# Patient Record
Sex: MALE | Race: WHITE | NOT HISPANIC OR LATINO | Employment: STUDENT | URBAN - METROPOLITAN AREA
[De-identification: names, ages, dates, MRNs, and addresses within clinical notes are randomized per-mention and may not be internally consistent; named-entity substitution may affect disease eponyms.]

---
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
## 2022-10-05 ENCOUNTER — OFFICE VISIT (OUTPATIENT)
Dept: FAMILY MEDICINE | Facility: CLINIC | Age: 22
End: 2022-10-05
Payer: COMMERCIAL

## 2022-10-05 VITALS
SYSTOLIC BLOOD PRESSURE: 124 MMHG | DIASTOLIC BLOOD PRESSURE: 72 MMHG | OXYGEN SATURATION: 98 % | HEART RATE: 58 BPM | HEIGHT: 72 IN | WEIGHT: 221 LBS | BODY MASS INDEX: 29.93 KG/M2

## 2022-10-05 DIAGNOSIS — F90.0 ADHD (ATTENTION DEFICIT HYPERACTIVITY DISORDER), INATTENTIVE TYPE: Primary | ICD-10-CM

## 2022-10-05 DIAGNOSIS — Z11.4 SCREENING FOR HIV (HUMAN IMMUNODEFICIENCY VIRUS): ICD-10-CM

## 2022-10-05 DIAGNOSIS — Z11.59 NEED FOR HEPATITIS C SCREENING TEST: ICD-10-CM

## 2022-10-05 PROCEDURE — 99203 OFFICE O/P NEW LOW 30 MIN: CPT | Performed by: PHYSICIAN ASSISTANT

## 2022-10-05 RX ORDER — METHYLPHENIDATE HYDROCHLORIDE 54 MG/1
54 TABLET, EXTENDED RELEASE ORAL DAILY
Qty: 30 TABLET | Refills: 0 | Status: SHIPPED | OUTPATIENT
Start: 2022-10-05 | End: 2022-12-09

## 2022-10-05 RX ORDER — METHYLPHENIDATE HYDROCHLORIDE 54 MG/1
1 TABLET, EXTENDED RELEASE ORAL DAILY
COMMUNITY
Start: 2022-08-12 | End: 2022-10-05

## 2022-10-05 RX ORDER — METHYLPHENIDATE HYDROCHLORIDE 54 MG/1
54 TABLET, EXTENDED RELEASE ORAL DAILY
Qty: 30 TABLET | Refills: 0 | Status: SHIPPED | OUTPATIENT
Start: 2022-10-05 | End: 2023-01-10

## 2022-10-05 ASSESSMENT — ENCOUNTER SYMPTOMS
CONSTITUTIONAL NEGATIVE: 1
DYSPHORIC MOOD: 0
DECREASED CONCENTRATION: 1
CARDIOVASCULAR NEGATIVE: 1
NEUROLOGICAL NEGATIVE: 1
NERVOUS/ANXIOUS: 0
HYPERACTIVE: 0
SLEEP DISTURBANCE: 0

## 2022-10-05 NOTE — PROGRESS NOTES
Assessment & Plan     Screening for HIV (human immunodeficiency virus)  Defers for today    Need for hepatitis C screening test  Defers for today    ADHD (attention deficit hyperactivity disorder), inattentive type  Stable and doing well recheck in 60 days may be virtual if he wishes  - CONCERTA 54 MG CR tablet  Dispense: 30 tablet; Refill: 0  - CONCERTA 54 MG CR tablet  Dispense: 30 tablet; Refill: 0               BMI:   Estimated body mass index is 29.97 kg/m  as calculated from the following:    Height as of this encounter: 1.829 m (6').    Weight as of this encounter: 100.2 kg (221 lb).         No follow-ups on file.    LIZETTE Gruber  [facility] [place]    Marshall Calle is a 22 year old, presenting for the following health issues:  A.D.H.MATILDE      [facility]  presents for ADHD evaluation he brings in a form from [facility] where he was diagnosed.  He is on Concerta and is doing well with that medication.  He has no homicidal or suicidal ideation he has no neurologic side effects he has no cardiac side effects he has no problems sleeping his appetite is good all of his classes are in person except for physical education which is online and he wears a fitness tracker for this he verbalizes no other complaints or concerns he has been on the Concerta for a number of years     A.D.H.D    History of Present Illness       Reason for visit:  ADHD Medicine    He eats 2-3 servings of fruits and vegetables daily.He consumes 1 sweetened beverage(s) daily.He exercises with enough effort to increase his heart rate 60 or more minutes per day.  He exercises with enough effort to increase his heart rate 5 days per week.   He is taking medications regularly.       ADHD Follow-Up    Date of last ADHD office visit: First Appointment w/provider    Taking controlled (daily) medications as prescribed: Yes                       Parent/Patient Concerns with  Medications: None  ADHD Medication     Stimulants - Misc. Disp Start End     CONCERTA 54 MG CR tablet     8/12/2022     Sig - Route: Take 1 tablet by mouth daily - Oral    Class: Historical          School:  Name of  : [employer]  Grade: FRESHMAN   School Concerns/Teacher Feedback: None  School services/Modifications: none  Homework: Stable  Grades: Stable    Sleep: no problems  Home/Family Concerns: None  Peer Concerns: None    Co-Morbid Diagnosis: None    Currently in counseling: No      Medication Benefits:   Controlled symptoms: Hyperactivity - motor restlessness, Attention span, Distractability, Finishing tasks, Frustration tolerance and School failure      Medication side effects:  Side effects noted: none              Review of Systems   Constitutional: Negative.    Cardiovascular: Negative.    Neurological: Negative.    Psychiatric/Behavioral: Positive for decreased concentration. Negative for dysphoric mood, mood changes, self-injury, sleep disturbance and suicidal ideas. The patient is not nervous/anxious and is not hyperactive.             Objective    /72   Pulse 58   Ht 1.829 m (6')   Wt 100.2 kg (221 lb)   SpO2 98%   BMI 29.97 kg/m    Body mass index is 29.97 kg/m .  Physical Exam is canals and drums normal oropharynx benign  Neck supple no adenopathy  Lungs clear well ventilated  Cardiovascular good rate and rhythm  There are no tics  He converses well makes good eye contact he is dressed and groomed appropriately

## 2022-12-06 ENCOUNTER — TELEPHONE (OUTPATIENT)
Dept: FAMILY MEDICINE | Facility: CLINIC | Age: 22
End: 2022-12-06

## 2022-12-06 NOTE — TELEPHONE ENCOUNTER
Patient contacted and scheduled for an appointment with provider on 12-9-22 at 920am    Ken Otero LPN on 12/6/2022 at 9:57 AM

## 2022-12-06 NOTE — TELEPHONE ENCOUNTER
Reason for Call:  Appointment Request    Patient requesting this type of appt:  Follow up office visit    Requested provider: Kit Davis    Reason patient unable to be scheduled: Not within requested timeframe    When does patient want to be seen/preferred time: 3-7 days    Comments: Pt needs med follow up visit with Susan, terrance openings unil 12/16, however that is the date the patient leaves out of state to head home until 12/27. Patient is wondering if he can be worked in to providers schedule within the next week either in person or virtual to ensure he can get his refills.    Okay to leave a detailed message?: Yes at Home number on file [phone] (home)    Call taken on 12/6/2022 at 9:27 AM by Savanna Hannon

## 2022-12-09 ENCOUNTER — OFFICE VISIT (OUTPATIENT)
Dept: FAMILY MEDICINE | Facility: CLINIC | Age: 22
End: 2022-12-09
Payer: COMMERCIAL

## 2022-12-09 ENCOUNTER — TELEPHONE (OUTPATIENT)
Dept: FAMILY MEDICINE | Facility: CLINIC | Age: 22
End: 2022-12-09

## 2022-12-09 VITALS
TEMPERATURE: 96.9 F | WEIGHT: 224.87 LBS | SYSTOLIC BLOOD PRESSURE: 122 MMHG | DIASTOLIC BLOOD PRESSURE: 76 MMHG | BODY MASS INDEX: 30.5 KG/M2 | OXYGEN SATURATION: 99 % | HEART RATE: 48 BPM | RESPIRATION RATE: 20 BRPM

## 2022-12-09 DIAGNOSIS — F90.0 ADHD (ATTENTION DEFICIT HYPERACTIVITY DISORDER), INATTENTIVE TYPE: Primary | ICD-10-CM

## 2022-12-09 PROCEDURE — 99213 OFFICE O/P EST LOW 20 MIN: CPT | Performed by: PHYSICIAN ASSISTANT

## 2022-12-09 RX ORDER — METHYLPHENIDATE HYDROCHLORIDE 54 MG/1
54 TABLET, EXTENDED RELEASE ORAL DAILY
Qty: 30 TABLET | Refills: 0 | Status: SHIPPED | OUTPATIENT
Start: 2022-12-09 | End: 2023-01-13

## 2022-12-09 RX ORDER — METHYLPHENIDATE HYDROCHLORIDE 54 MG/1
54 TABLET, EXTENDED RELEASE ORAL DAILY
Qty: 30 TABLET | Refills: 0 | Status: SHIPPED | OUTPATIENT
Start: 2022-12-09 | End: 2023-02-07

## 2022-12-09 ASSESSMENT — ENCOUNTER SYMPTOMS
TREMORS: 0
HEADACHES: 0
SLEEP DISTURBANCE: 0
DECREASED CONCENTRATION: 1

## 2022-12-09 NOTE — TELEPHONE ENCOUNTER
General Call    Contacts       Type Contact Phone/Fax    12/09/2022 12:13 PM CST Phone (Incoming) Luc Busch (Self) [phone] (M)        Reason for Call:  Change to Concerta to generic brand name for the month of January.    What are your questions or concerns: Pt called to request a change in his Concerta Rx to generic brand name for the month of January. Pt states the December Rx is ok but pharmacy is requesting the generic brand for the month of January. A new Rx will have be sent to the pharmacy.      Date of last appointment with provider: 12/09/2022    Okay to leave a detailed message?: Yes at Cell number on file:    Telephone Information:   Mobile [phone]     [employer]

## 2022-12-09 NOTE — PROGRESS NOTES
Assessment & Plan     ADHD (attention deficit hyperactivity disorder), inattentive type  PDMP consulted renew meds for 2 months follow-up virtually or in person at that time prior as needed  - CONCERTA 54 MG CR tablet  Dispense: 30 tablet; Refill: 0  - CONCERTA 54 MG CR tablet  Dispense: 30 tablet; Refill: 0               BMI:   Estimated body mass index is 30.5 kg/m  as calculated from the following:    Height as of 10/5/22: 1.829 m (6').    Weight as of this encounter: 102 kg (224 lb 13.9 oz).           No follow-ups on file.    LIZETTE Gruber  [facility] [place]    Marshall Calle is a 22 year old, presenting for the following health issues:  DULCE MARIA      22-year-old male here for ADHD follow-up medications working well school going well he plays hockey for [employer]  No side effects  He plans to return home to [place] in the near future as he has some time off over the [place] break    History of Present Illness       Reason for visit:  ADHD perscription    He eats 2-3 servings of fruits and vegetables daily.He consumes 0 sweetened beverage(s) daily.He exercises with enough effort to increase his heart rate 60 or more minutes per day.  He exercises with enough effort to increase his heart rate 6 days per week.   He is taking medications regularly.     ADHD Follow-Up    Date of last ADHD office visit: 10-5-22  Status since last visit: Stable  Taking controlled (daily) medications as prescribed: Yes                       Parent/Patient Concerns with Medications: None  ADHD Medication     Stimulants - Misc. Disp Start End     CONCERTA 54 MG CR tablet    30 tablet 10/5/2022     Sig - Route: Take 1 tablet (54 mg) by mouth daily - Oral    Class: E-Prescribe    Earliest Fill Date: 10/5/2022     CONCERTA 54 MG CR tablet    30 tablet 10/5/2022     Sig - Route: Take 1 tablet (54 mg) by mouth daily - Oral    Class: E-Prescribe    Earliest Fill Date:  10/5/2022    Notes to Pharmacy: Fill in thirty days          School:  Name of  : [employer]  Grade: [employer]ESH[employer]   Grades: Stable    Sleep: no problems    Co-Morbid Diagnosis: None    Currently in counseling: No        Medication Benefits:   Controlled symptoms: Hyperactivity - motor restlessness, Attention span, Distractability, Finishing tasks and Impulse control      Medication side effects:  Side effects noted: none                Review of Systems   Neurological: Negative for tremors and headaches.   Psychiatric/Behavioral: Positive for decreased concentration. Negative for self-injury, sleep disturbance and suicidal ideas.            Objective    /76   Pulse (!) 48   Temp 96.9  F (36.1  C)   Resp 20   Wt 102 kg (224 lb 13.9 oz)   SpO2 99%   BMI 30.50 kg/m    Body mass index is 30.5 kg/m .  Physical Exam attentive no acute distress  Dressed and groomed appropriately  Lungs clear well ventilated  Cardiovascular regular in rhythm

## 2022-12-09 NOTE — TELEPHONE ENCOUNTER
"It appears both scripts were written \"BENNY\".  Spoke with Kit Davis PA-C.  Verbally authorized \"OK to dispense generic\".  Pharmacy updated.      Ken Otero LPN on 12/9/2022 at 12:24 PM   "

## 2023-01-09 NOTE — TELEPHONE ENCOUNTER
That pharmacy doesn't do concerta    Can you send to [address]  [phone]      Call patient if questions  [phone]  Ok to leave message

## 2023-01-10 DIAGNOSIS — F90.0 ADHD (ATTENTION DEFICIT HYPERACTIVITY DISORDER), INATTENTIVE TYPE: ICD-10-CM

## 2023-01-10 RX ORDER — METHYLPHENIDATE HYDROCHLORIDE 54 MG/1
54 TABLET ORAL EVERY MORNING
Qty: 30 TABLET | Refills: 0 | Status: SHIPPED | OUTPATIENT
Start: 2023-01-10 | End: 2023-02-07

## 2023-01-12 ENCOUNTER — TELEPHONE (OUTPATIENT)
Dept: FAMILY MEDICINE | Facility: CLINIC | Age: 23
End: 2023-01-12
Payer: COMMERCIAL

## 2023-01-12 ENCOUNTER — TELEPHONE (OUTPATIENT)
Dept: FAMILY MEDICINE | Facility: CLINIC | Age: 23
End: 2023-01-12

## 2023-01-12 NOTE — TELEPHONE ENCOUNTER
Prior Authorization Retail Medication Request    Medication/Dose: Needs prior auth for brand concerta b/c of shortage   ICD code (if different than what is on RX):    Previously Tried and Failed:    Rationale:      Insurance Name:    Insurance ID:        Pharmacy Information (if different than what is on RX)  Name:    Phone:

## 2023-01-12 NOTE — TELEPHONE ENCOUNTER
Patient is waiting for PA to be completed for medication.    Patient is calling to request a RX for # 5- 7 tabs to cover until PA is process and he would pay cash for small amount to cover.      Concerta:    [facility] in [place].

## 2023-01-12 NOTE — TELEPHONE ENCOUNTER
Insurance is not longer covering brand concerta - there is a manufacture shortage on the generic and needs a prior auth for the brand. Hasn't had med for over a week.    Reason for Call:  Medication or medication refill:    Do you use a [facility]?  Name of the pharmacy and phone number for the current request: [facility]     Name of the medication requested: Concerta 54 mg    Other request:  Would like to get meds in [facility] as he is living there now    Can we leave a detailed message on this number? YES    Phone number patient can be reached at: Cell number on file:    Telephone Information:   Mobile [phone]       Best Time: after 2:00     Call taken on 1/12/2023 at 10:58 AM by Karis Montoya

## 2023-01-13 DIAGNOSIS — F90.0 ADHD (ATTENTION DEFICIT HYPERACTIVITY DISORDER), INATTENTIVE TYPE: ICD-10-CM

## 2023-01-13 RX ORDER — METHYLPHENIDATE HYDROCHLORIDE 54 MG/1
54 TABLET, EXTENDED RELEASE ORAL DAILY
Qty: 7 TABLET | Refills: 0 | Status: SHIPPED | OUTPATIENT
Start: 2023-01-13 | End: 2023-02-07

## 2023-01-14 NOTE — TELEPHONE ENCOUNTER
Central Prior Authorization Team   Phone: [phone]      SUBMITTED THROUGH Epic'S EPA PORTAL:WAITING ON INSURANCE RESPONSE

## 2023-01-21 NOTE — TELEPHONE ENCOUNTER
Central Prior Authorization Team   Phone: [phone]      EPA Denied, there is a separate encounter for the denial information.

## 2023-02-07 ENCOUNTER — OFFICE VISIT (OUTPATIENT)
Dept: FAMILY MEDICINE | Facility: CLINIC | Age: 23
End: 2023-02-07
Payer: COMMERCIAL

## 2023-02-07 VITALS
TEMPERATURE: 97.7 F | SYSTOLIC BLOOD PRESSURE: 124 MMHG | HEART RATE: 56 BPM | RESPIRATION RATE: 16 BRPM | BODY MASS INDEX: 30.07 KG/M2 | HEIGHT: 72 IN | DIASTOLIC BLOOD PRESSURE: 78 MMHG | OXYGEN SATURATION: 98 % | WEIGHT: 222 LBS

## 2023-02-07 DIAGNOSIS — F90.0 ADHD (ATTENTION DEFICIT HYPERACTIVITY DISORDER), INATTENTIVE TYPE: Primary | ICD-10-CM

## 2023-02-07 PROCEDURE — 99213 OFFICE O/P EST LOW 20 MIN: CPT | Performed by: PHYSICIAN ASSISTANT

## 2023-02-07 RX ORDER — LISDEXAMFETAMINE DIMESYLATE 30 MG/1
30 CAPSULE ORAL EVERY MORNING
Qty: 30 CAPSULE | Refills: 0 | Status: SHIPPED | OUTPATIENT
Start: 2023-02-07 | End: 2023-05-02

## 2023-02-07 ASSESSMENT — ENCOUNTER SYMPTOMS
NERVOUS/ANXIOUS: 0
SLEEP DISTURBANCE: 0
DECREASED CONCENTRATION: 1

## 2023-02-07 NOTE — PROGRESS NOTES
Assessment & Plan     ADHD (attention deficit hyperactivity disorder), inattentive type  We will switch to Vyvanse due to insurance restrictions and noncoverage for his Concerta he will call in 30 days and let us know how that is working we can refill for another 2 months if working effectively if not we will have to try one of the other covered alternatives  - lisdexamfetamine (VYVANSE) 30 MG capsule  Dispense: 30 capsule; Refill: 0               BMI:   Estimated body mass index is 30.11 kg/m  as calculated from the following:    Height as of this encounter: 1.829 m (6').    Weight as of this encounter: 100.7 kg (222 lb).           No follow-ups on file.    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    KYLE.[email or web address]    History of Present Illness       Reason for visit:  ADHD Medicine    He eats 2-3 servings of fruits and vegetables daily.He consumes 1 sweetened beverage(s) daily.He exercises with enough effort to increase his heart rate 60 or more minutes per day.  He exercises with enough effort to increase his heart rate 6 days per week.   He is taking medications regularly.       ADHD Follow-Up    Date of last ADHD office visit: 12-9-22  Status since last visit: Stable  Taking controlled (daily) medications as prescribed: Yes                       Parent/Patient Concerns with Medications: Insurance wanting him to change to a different medication, but patient doesn't think he should  ADHD  Medication     Stimulants - Misc. Disp Start End     CONCERTA 54 MG CR tablet    7 tablet 1/13/2023     Sig - Route: Take 1 tablet (54 mg) by mouth daily - Oral    Class: E-Prescribe    Earliest Fill Date: 1/13/2023    Prior authorization: Denied     CONCERTA 54 MG CR tablet    30 tablet 12/9/2022     Sig - Route: Take 1 tablet (54 mg) by mouth daily - Oral    Class: E-Prescribe    Earliest Fill Date: 12/9/2022    Notes to Pharmacy: Fill in thirty days    Prior authorization: Closed     methylphenidate (CONCERTA) 54 MG CR tablet    30 tablet 1/10/2023     Sig - Route: Take 1 tablet (54 mg) by mouth every morning - Oral    Class: E-Prescribe    Earliest Fill Date: 1/10/2023            Sleep: no problems  Co-Morbid Diagnosis: None    Currently in counseling: No        Medication Benefits:   Controlled symptoms: Hyperactivity - motor restlessness, Attention span, Distractability, Finishing tasks and Impulse control      Medication side effects:  Side effects noted: none              Review of Systems   Psychiatric/Behavioral: Positive for decreased concentration. Negative for self-injury, sleep disturbance and suicidal ideas. The patient is not nervous/anxious.             Objective    There were no vitals taken for this visit.  There is no height or weight on file to calculate BMI.  Physical Exam alert attentive no acute distress  Dressed and groomed appropriately  Lungs clear to ventilator  Cardiovascular regular rate and rhythm

## 2023-02-10 ENCOUNTER — TELEPHONE (OUTPATIENT)
Dept: FAMILY MEDICINE | Facility: CLINIC | Age: 23
End: 2023-02-10
Payer: COMMERCIAL

## 2023-02-10 NOTE — TELEPHONE ENCOUNTER
Left message requesting patient to call back.  If he calls back, please ask patient if we can speak with his mother regarding his health.    Ken Otero LPN on 2/10/2023 at 11:47 AM

## 2023-02-10 NOTE — TELEPHONE ENCOUNTER
Selin calling in and wanting to speak with Kit Davis. She states its about carlyn. She declined giving me any more information.    celestine [phone]

## 2023-02-10 NOTE — TELEPHONE ENCOUNTER
"No consent to communicate.  Writer called and spoke with mother. Updated that we do not have consent to communicate.  Mother shared that she has concerns that patient is being prescribed controlled substances is \"abundance\" and would like to speak with provider.    Patient is in college (local) and mother is in [place].  Mother stated that (we) can call patient and ask for consent.  Patient is unable to come to clinic to sign paper work.        "

## 2023-02-26 DIAGNOSIS — F90.0 ADHD (ATTENTION DEFICIT HYPERACTIVITY DISORDER), INATTENTIVE TYPE: Primary | ICD-10-CM

## 2023-02-26 RX ORDER — METHYLPHENIDATE HYDROCHLORIDE 54 MG/1
54 TABLET ORAL EVERY MORNING
Qty: 30 TABLET | Refills: 0 | Status: SHIPPED | OUTPATIENT
Start: 2023-02-26 | End: 2023-04-03

## 2023-03-30 DIAGNOSIS — F90.0 ADHD (ATTENTION DEFICIT HYPERACTIVITY DISORDER), INATTENTIVE TYPE: ICD-10-CM

## 2023-03-30 RX ORDER — METHYLPHENIDATE HYDROCHLORIDE 54 MG/1
54 TABLET ORAL EVERY MORNING
Qty: 30 TABLET | Refills: 0 | OUTPATIENT
Start: 2023-03-30

## 2023-03-30 NOTE — TELEPHONE ENCOUNTER
Last office visit: 2/7/2023     Future Appointments 3/30/2023 - 9/26/2023    None          Requested Prescriptions   Pending Prescriptions Disp Refills     methylphenidate (CONCERTA) 54 MG CR tablet 30 tablet 0     Sig: Take 1 tablet (54 mg) by mouth every morning       There is no refill protocol information for this order

## 2023-03-30 NOTE — TELEPHONE ENCOUNTER
I had Concerta as non covered by insurance. Does he want that filled? Is he willing to come into the clinic and sign a release so I can exchange his health information with his mother?    KAH

## 2023-04-02 ENCOUNTER — TELEPHONE (OUTPATIENT)
Dept: FAMILY MEDICINE | Facility: CLINIC | Age: 23
End: 2023-04-02
Payer: COMMERCIAL

## 2023-04-02 ENCOUNTER — TELEPHONE (OUTPATIENT)
Dept: NURSING | Facility: CLINIC | Age: 23
End: 2023-04-02
Payer: COMMERCIAL

## 2023-04-02 DIAGNOSIS — F90.0 ADHD (ATTENTION DEFICIT HYPERACTIVITY DISORDER), INATTENTIVE TYPE: ICD-10-CM

## 2023-04-02 RX ORDER — METHYLPHENIDATE HYDROCHLORIDE 54 MG/1
54 TABLET ORAL EVERY MORNING
Qty: 30 TABLET | Refills: 0 | Status: CANCELLED | OUTPATIENT
Start: 2023-04-02

## 2023-04-02 NOTE — TELEPHONE ENCOUNTER
Patient is calling today regarding his Concerta.  FNA relayed message from Kit Davis PA and patient states that he will come into clinic to sign a release.  Patient is wanting Concerta refilled.     Cari Maldonado RN/FNA

## 2023-04-02 NOTE — TELEPHONE ENCOUNTER
PT came into clinic and inquired as to status of refill for ADHD meds; he is out of medication and would like to know when a refill will be sent into [facility] here in [place].  He is ok with brand or generic.      Medication Question or Refill    Contacts       Type Contact Phone/Fax    04/02/2023 01:26 PM CDT Phone (Incoming) Luc Busch (Self) [phone] (M)          What medication are you calling about (include dose and sig)?:     Preferred Pharmacy:   [id number] - [facility] [address] AT [facility]  [address] [address]  Phone: [phone] Fax: [phone]      Controlled Substance Agreement on file:   CSA -- Patient Level:    CSA: None found at the patient level.       Who prescribed the medication?: KAH    Do you need a refill? Yes    Okay to leave a detailed message?: Yes at Cell number on file:    Telephone Information:   Mobile [phone]

## 2023-04-03 DIAGNOSIS — F90.0 ADHD (ATTENTION DEFICIT HYPERACTIVITY DISORDER), INATTENTIVE TYPE: ICD-10-CM

## 2023-04-03 RX ORDER — METHYLPHENIDATE HYDROCHLORIDE 54 MG/1
54 TABLET ORAL EVERY MORNING
Qty: 30 TABLET | Refills: 0 | Status: SHIPPED | OUTPATIENT
Start: 2023-04-03 | End: 2023-09-05

## 2023-04-03 RX ORDER — METHYLPHENIDATE HYDROCHLORIDE 54 MG/1
54 TABLET ORAL EVERY MORNING
Qty: 30 TABLET | Refills: 0 | Status: SHIPPED | OUTPATIENT
Start: 2023-04-03 | End: 2023-04-05

## 2023-04-03 RX ORDER — METHYLPHENIDATE HYDROCHLORIDE 54 MG/1
54 TABLET ORAL EVERY MORNING
Qty: 30 TABLET | Refills: 0 | Status: SHIPPED | OUTPATIENT
Start: 2023-04-03 | End: 2023-04-03

## 2023-04-03 NOTE — TELEPHONE ENCOUNTER
Evelyn Is out. Should be sent to [facility] annette Mejias on 4/3/2023 at 12:01 PM

## 2023-04-03 NOTE — TELEPHONE ENCOUNTER
Routing refill request to provider for review/approval because:  Drug not on the [facility] refill protocol     Last Written Prescription Date:  2/26/23  Last Fill Quantity: 30,  # refills: 0   Last office visit: 2/7/2023

## 2023-04-05 DIAGNOSIS — F90.0 ADHD (ATTENTION DEFICIT HYPERACTIVITY DISORDER), INATTENTIVE TYPE: ICD-10-CM

## 2023-04-05 RX ORDER — METHYLPHENIDATE HYDROCHLORIDE 54 MG/1
54 TABLET ORAL EVERY MORNING
Qty: 30 TABLET | Refills: 0 | Status: SHIPPED | OUTPATIENT
Start: 2023-04-05 | End: 2023-05-02

## 2023-04-05 NOTE — TELEPHONE ENCOUNTER
I need to have you call and confirm with his other two pharmacies that he hasn't had this filled already.    KAH

## 2023-04-05 NOTE — TELEPHONE ENCOUNTER
4-5-23      General Call    Reason for Call:  Pt requesting to switch pharmacies    What are your questions or concerns:  Pt called & stated now [facility] is out of :  methylphenidate (CONCERTA) 54 MG CR tablet  And now wants called into [facility] in [place]

## 2023-04-05 NOTE — TELEPHONE ENCOUNTER
Pt need to switch pharmacies. Everyone is out of stock. States its his 2nd call today    [id number] - [place], [place] - [address] AT [facility] [address]

## 2023-05-02 ENCOUNTER — OFFICE VISIT (OUTPATIENT)
Dept: FAMILY MEDICINE | Facility: CLINIC | Age: 23
End: 2023-05-02
Payer: COMMERCIAL

## 2023-05-02 VITALS
OXYGEN SATURATION: 97 % | WEIGHT: 227 LBS | SYSTOLIC BLOOD PRESSURE: 116 MMHG | RESPIRATION RATE: 16 BRPM | HEART RATE: 72 BPM | HEIGHT: 72 IN | TEMPERATURE: 98.1 F | BODY MASS INDEX: 30.75 KG/M2 | DIASTOLIC BLOOD PRESSURE: 74 MMHG

## 2023-05-02 DIAGNOSIS — F90.0 ADHD (ATTENTION DEFICIT HYPERACTIVITY DISORDER), INATTENTIVE TYPE: ICD-10-CM

## 2023-05-02 PROCEDURE — 99213 OFFICE O/P EST LOW 20 MIN: CPT | Performed by: PHYSICIAN ASSISTANT

## 2023-05-02 RX ORDER — METHYLPHENIDATE HYDROCHLORIDE 54 MG/1
54 TABLET ORAL EVERY MORNING
Qty: 30 TABLET | Refills: 0 | Status: SHIPPED | OUTPATIENT
Start: 2023-05-02 | End: 2023-09-05

## 2023-05-02 ASSESSMENT — ENCOUNTER SYMPTOMS
DECREASED CONCENTRATION: 1
NERVOUS/ANXIOUS: 0
TREMORS: 0
SLEEP DISTURBANCE: 0
HEADACHES: 0

## 2023-05-02 NOTE — PROGRESS NOTES
Assessment & Plan     ADHD (attention deficit hyperactivity disorder), inattentive type  We will see him in the fall when he returns to [place]  - methylphenidate (CONCERTA) 54 MG CR tablet  Dispense: 30 tablet; Refill:            BMI:   Estimated body mass index is 30.79 kg/m  as calculated from the following:    Height as of this encounter: 1.829 m (6').    Weight as of this encounter: 103 kg (227 lb).           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   History of Present Illness       Reason for visit:  Medication    He eats 2-3 servings of fruits and vegetables daily.He consumes 1 sweetened beverage(s) daily.He exercises with enough effort to increase his heart rate 60 or more minutes per day.  He exercises with enough effort to increase his heart rate 5 days per week.   He is taking medications regularly.       ADHD Follow-Up    Date of last ADHD office visit: 2-7-23  Status since last visit: Stable  Taking controlled (daily) medications as prescribed: No  Patient has had difficulty filling the medication at times due to supply shortages.                       Parent/Patient Concerns with  Medications: None  ADHD Medication     Stimulants - Misc. Disp Start End     methylphenidate (CONCERTA) 54 MG CR tablet    30 tablet 4/5/2023     Sig - Route: Take 1 tablet (54 mg) by mouth every morning - Oral    Class: E-Prescribe    Earliest Fill Date: 4/5/2023     methylphenidate (CONCERTA) 54 MG CR tablet    30 tablet 4/3/2023     Sig - Route: Take 1 tablet (54 mg) by mouth every morning - Oral    Class: E-Prescribe    Earliest Fill Date: 4/3/2023    Amphetamines Disp Start End     lisdexamfetamine (VYVANSE) 30 MG capsule    30 capsule 2/7/2023     Sig - Route: Take 1 capsule (30 mg) by mouth every morning - Oral    Class: E-Prescribe    Earliest Fill Date: 2/7/2023          Sleep: no problems  Home/Family Concerns: None      Co-Morbid Diagnosis: None    Currently in counseling: No      Medication Benefits:   Controlled symptoms: Hyperactivity - motor restlessness, Attention span, Distractability and Finishing tasks  Uncontrolled Symptoms: None    Medication side effects:  Side effects noted: none                Review of Systems   Neurological: Negative for tremors and headaches.   Psychiatric/Behavioral: Positive for decreased concentration. Negative for self-injury, sleep disturbance and suicidal ideas. The patient is not nervous/anxious.             Objective    /74   Pulse 72   Temp 98.1  F (36.7  C)   Resp 16   Ht 1.829 m (6')   Wt 103 kg (227 lb)   SpO2 97%   BMI 30.79 kg/m    Body mass index is 30.79 kg/m .  Physical Exam  Constitutional:       Appearance: Normal appearance.   Cardiovascular:      Rate and Rhythm: Normal rate and regular rhythm.      Heart sounds: Normal heart sounds.   Pulmonary:      Effort: Pulmonary effort is normal.      Breath sounds: Normal breath sounds.   Neurological:      Mental Status: He is alert.   Psychiatric:         Mood and Affect: Mood normal.         Behavior: Behavior normal.         Thought Content: Thought content normal.         Judgment: Judgment  normal.

## 2023-08-13 ENCOUNTER — HEALTH MAINTENANCE LETTER (OUTPATIENT)
Age: 23
End: 2023-08-13

## 2023-09-05 ENCOUNTER — OFFICE VISIT (OUTPATIENT)
Dept: FAMILY MEDICINE | Facility: CLINIC | Age: 23
End: 2023-09-05
Payer: COMMERCIAL

## 2023-09-05 VITALS
WEIGHT: 234 LBS | RESPIRATION RATE: 16 BRPM | HEART RATE: 76 BPM | HEIGHT: 72 IN | TEMPERATURE: 97.7 F | BODY MASS INDEX: 31.69 KG/M2 | SYSTOLIC BLOOD PRESSURE: 124 MMHG | OXYGEN SATURATION: 96 % | DIASTOLIC BLOOD PRESSURE: 78 MMHG

## 2023-09-05 DIAGNOSIS — F90.0 ADHD (ATTENTION DEFICIT HYPERACTIVITY DISORDER), INATTENTIVE TYPE: Primary | ICD-10-CM

## 2023-09-05 PROCEDURE — 99213 OFFICE O/P EST LOW 20 MIN: CPT | Performed by: PHYSICIAN ASSISTANT

## 2023-09-05 RX ORDER — METHYLPHENIDATE HYDROCHLORIDE 54 MG/1
54 TABLET ORAL EVERY MORNING
Qty: 30 TABLET | Refills: 0 | Status: SHIPPED | OUTPATIENT
Start: 2023-09-05 | End: 2023-11-13

## 2023-09-05 ASSESSMENT — ENCOUNTER SYMPTOMS
CARDIOVASCULAR NEGATIVE: 1
NEUROLOGICAL NEGATIVE: 1
SLEEP DISTURBANCE: 0
NERVOUS/ANXIOUS: 0
DECREASED CONCENTRATION: 1

## 2023-09-05 NOTE — PROGRESS NOTES
Assessment & Plan     ADHD (attention deficit hyperactivity disorder), inattentive type  See in 2 months and as needed  - methylphenidate HCl ER, OSM, (CONCERTA) 54 MG CR tablet  Dispense: 30 tablet; Refill: 0  - methylphenidate HCl ER, OSM, (CONCERTA) 54 MG CR tablet  Dispense: 30 tablet; Refill: 0               BMI:   Estimated body mass index is 31.74 kg/m  as calculated from the following:    Height as of this encounter: 1.829 m (6').    Weight as of this encounter: 106.1 kg (234 lb).           LIZETTE Gruber  [facility]    Marshall Calle is a 23 year old, presenting for the following health issues:  A.D.H.MATILDE      Patient here for ADHD follow-up  Medication working well  No side effects  Appetite good sleep good  Classes start tomorrow he has 2 years left      History of Present Illness       Reason for visit:  ADHD Medicine    He eats 2-3 servings of fruits and vegetables daily.He consumes 1 sweetened beverage(s) daily.He exercises with enough effort to increase his heart rate 60 or more minutes per day.  He exercises with enough effort to increase his heart rate 5 days per week.   He is taking medications regularly.       ADHD Follow-Up (Adult)  Concerns: None  Changes since last visit: None  Taking controlled (daily) medications as prescribed: Yes  Sleep: no problems  Adult ADHD Self-Reporting form given to patient?:  No  Currently in counseling: No    Medication Benefits:   Controlled symptoms: Attention span, Distractability, and Finishing tasks  Uncontrolled symptoms:  None    Medication Side Effects:  Reports:  none          Review of Systems   Cardiovascular: Negative.    Neurological: Negative.    Psychiatric/Behavioral:  Positive for decreased concentration. Negative for self-injury and sleep disturbance. The patient is not nervous/anxious.             Objective    /78   Pulse 76   Temp 97.7  F (36.5  C)   Resp 16   Ht 1.829 m (6')   Wt 106.1 kg (234  lb)   SpO2 96%   BMI 31.74 kg/m    Body mass index is 31.74 kg/m .  Physical Exam lungs clear to ventilated  Cardiovascular regular rate and rhythm  Alert attentive no acute distress  Dressed and groomed appropriately

## 2023-10-08 ENCOUNTER — TELEPHONE (OUTPATIENT)
Dept: FAMILY MEDICINE | Facility: CLINIC | Age: 23
End: 2023-10-08
Payer: COMMERCIAL

## 2023-10-08 NOTE — TELEPHONE ENCOUNTER
Attempted to call patient to inform him the pharmacy verified there is a refill available. No answer and vm full, so a [place] message was sent.

## 2023-10-08 NOTE — TELEPHONE ENCOUNTER
Medication Question or Refill    Contacts         Type Contact Phone/Fax    10/08/2023 11:45 AM CDT Phone (Incoming) Luc Busch (Self) [phone] (M)            What medication are you calling about (include dose and sig)?: Concerta    Preferred Pharmacy:     [facility] in [address]      Controlled Substance Agreement on file:   CSA -- Patient Level:    CSA: None found at the patient level.       Who prescribed the medication?: Kit Davis    Do you need a refill? Yes    When did you use the medication last? Today    Patient offered an appointment? No    Do you have any questions or concerns?  No      Could we send this information to you in [facility] or would you prefer to receive a phone call?:   Patient would prefer a phone call   Okay to leave a detailed message?: Yes at Cell number on file:    Telephone Information:   Mobile [phone]

## 2023-10-09 ENCOUNTER — TELEPHONE (OUTPATIENT)
Dept: FAMILY MEDICINE | Facility: CLINIC | Age: 23
End: 2023-10-09
Payer: COMMERCIAL

## 2023-11-13 ENCOUNTER — OFFICE VISIT (OUTPATIENT)
Dept: FAMILY MEDICINE | Facility: CLINIC | Age: 23
End: 2023-11-13
Payer: COMMERCIAL

## 2023-11-13 VITALS
BODY MASS INDEX: 31.75 KG/M2 | SYSTOLIC BLOOD PRESSURE: 120 MMHG | HEIGHT: 72 IN | TEMPERATURE: 97.8 F | HEART RATE: 70 BPM | DIASTOLIC BLOOD PRESSURE: 68 MMHG | WEIGHT: 234.4 LBS | RESPIRATION RATE: 18 BRPM | OXYGEN SATURATION: 98 %

## 2023-11-13 DIAGNOSIS — F90.0 ADHD (ATTENTION DEFICIT HYPERACTIVITY DISORDER), INATTENTIVE TYPE: ICD-10-CM

## 2023-11-13 PROCEDURE — 99213 OFFICE O/P EST LOW 20 MIN: CPT | Performed by: PHYSICIAN ASSISTANT

## 2023-11-13 RX ORDER — METHYLPHENIDATE HYDROCHLORIDE 54 MG/1
54 TABLET ORAL EVERY MORNING
Qty: 30 TABLET | Refills: 0 | Status: SHIPPED | OUTPATIENT
Start: 2023-11-13 | End: 2023-12-12

## 2023-11-13 RX ORDER — METHYLPHENIDATE HYDROCHLORIDE 54 MG/1
54 TABLET ORAL EVERY MORNING
Qty: 30 TABLET | Refills: 0 | Status: SHIPPED | OUTPATIENT
Start: 2023-11-13 | End: 2024-01-08

## 2023-11-13 ASSESSMENT — ENCOUNTER SYMPTOMS
NEUROLOGICAL NEGATIVE: 1
DECREASED CONCENTRATION: 1
CARDIOVASCULAR NEGATIVE: 1

## 2023-11-13 NOTE — PROGRESS NOTES
Assessment & Plan     ADHD (attention deficit hyperactivity disorder), inattentive type  Filled medication for 2 months  - methylphenidate HCl ER, OSM, (CONCERTA) 54 MG CR tablet  Dispense: 30 tablet; Refill: 0  - methylphenidate HCl ER, OSM, (CONCERTA) 54 MG CR tablet  Dispense: 30 tablet; Refill: 0               BMI:   Estimated body mass index is 31.79 kg/m  as calculated from the following:    Height as of this encounter: 1.829 m (6').    Weight as of this encounter: 106.3 kg (234 lb 6.4 oz).           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  He exercises with enough effort to increase his heart rate 6 days per week.   He is taking medications regularly.                 Review of Systems   Cardiovascular: Negative.    Neurological: Negative.    Psychiatric/Behavioral:  Positive for decreased concentration.             Objective    /68 (BP Location: Right arm, Patient Position: Sitting, Cuff Size: Adult Large)   Pulse 70   Temp 97.8  F (36.6  C) (Tympanic)   Resp 18   Ht 1.829 m (6')   Wt 106.3 kg (234 lb 6.4 oz)   SpO2 98%   BMI 31.79 kg/m    Body mass index is 31.79 kg/m .  Physical Exam attentive no acute distress  Dressed and groomed appropriately  Lungs clear to ventilated  Cardiovascular regular rate and rhythm

## 2023-12-12 DIAGNOSIS — F90.0 ADHD (ATTENTION DEFICIT HYPERACTIVITY DISORDER), INATTENTIVE TYPE: ICD-10-CM

## 2023-12-12 RX ORDER — METHYLPHENIDATE HYDROCHLORIDE 54 MG/1
54 TABLET ORAL EVERY MORNING
Qty: 30 TABLET | Refills: 0 | Status: SHIPPED | OUTPATIENT
Start: 2023-12-12 | End: 2024-01-08

## 2024-01-08 ENCOUNTER — OFFICE VISIT (OUTPATIENT)
Dept: FAMILY MEDICINE | Facility: CLINIC | Age: 24
End: 2024-01-08
Payer: COMMERCIAL

## 2024-01-08 VITALS
WEIGHT: 239.8 LBS | OXYGEN SATURATION: 97 % | DIASTOLIC BLOOD PRESSURE: 70 MMHG | HEIGHT: 72 IN | SYSTOLIC BLOOD PRESSURE: 118 MMHG | HEART RATE: 70 BPM | RESPIRATION RATE: 22 BRPM | BODY MASS INDEX: 32.48 KG/M2 | TEMPERATURE: 97.9 F

## 2024-01-08 DIAGNOSIS — F90.0 ADHD (ATTENTION DEFICIT HYPERACTIVITY DISORDER), INATTENTIVE TYPE: ICD-10-CM

## 2024-01-08 PROCEDURE — 99213 OFFICE O/P EST LOW 20 MIN: CPT | Performed by: PHYSICIAN ASSISTANT

## 2024-01-08 RX ORDER — METHYLPHENIDATE HYDROCHLORIDE 54 MG/1
54 TABLET ORAL EVERY MORNING
Qty: 30 TABLET | Refills: 0 | Status: SHIPPED | OUTPATIENT
Start: 2024-01-08 | End: 2024-05-31

## 2024-01-08 RX ORDER — METHYLPHENIDATE HYDROCHLORIDE 54 MG/1
54 TABLET ORAL EVERY MORNING
Qty: 30 TABLET | Refills: 0 | Status: SHIPPED | OUTPATIENT
Start: 2024-01-08 | End: 2024-03-15

## 2024-01-08 ASSESSMENT — ENCOUNTER SYMPTOMS
NERVOUS/ANXIOUS: 0
CARDIOVASCULAR NEGATIVE: 1
DECREASED CONCENTRATION: 1
NEUROLOGICAL NEGATIVE: 1
SLEEP DISTURBANCE: 0

## 2024-01-08 NOTE — PROGRESS NOTES
Assessment & Plan     ADHD (attention deficit hyperactivity disorder), inattentive type  Refilled medication  - methylphenidate HCl ER, OSM, (CONCERTA) 54 MG CR tablet  Dispense: 30 tablet; Refill: 0  - methylphenidate HCl ER, OSM, (CONCERTA) 54 MG CR tablet  Dispense: 30 tablet; Refill: 0               BMI:   Estimated body mass index is 32.52 kg/m  as calculated from the following:    Height as of this encounter: 1.829 m (6').    Weight as of this encounter: 108.8 kg (239 lb 12.8 oz).           LIZETTE Gruber  [facility]    Marshall Calle is a 23 year old, presenting for the following health issues:  A.D.H.D        1/8/2024    10:21 AM   Additional Questions   Roomed by KELLIE Boone       23-year-old here for ADHD check  He is on Concerta 54 mg daily  He plays hockey for the local [employer]  Classes: Well  Hockey going well  Sleeping well  No palpitations or tremors or tics    History of Present Illness       Reason for visit:  ADHD Medicine    He eats 2-3 servings of fruits and vegetables daily.He consumes 1 sweetened beverage(s) daily.He exercises with enough effort to increase his heart rate 60 or more minutes per day.  He exercises with enough effort to increase his heart rate 6 days per week.   He is taking medications regularly.                 Review of Systems   Cardiovascular: Negative.    Neurological: Negative.    Psychiatric/Behavioral:  Positive for decreased concentration. Negative for mood changes, self-injury, sleep disturbance and suicidal ideas. The patient is not nervous/anxious.           Objective    /70 (BP Location: Right arm, Patient Position: Sitting, Cuff Size: Adult Large)   Pulse 70   Temp 97.9  F (36.6  C) (Tympanic)   Resp 22   Ht 1.829 m (6')   Wt 108.8 kg (239 lb 12.8 oz)   SpO2 97%   BMI 32.52 kg/m    Body mass index is 32.52 kg/m .  Physical Exam attentive no acute distress  Dressed and groomed appropriately  Lungs clear  ventilated  Cardiovascular regular rate and rhythm

## 2024-01-10 DIAGNOSIS — F90.0 ADHD (ATTENTION DEFICIT HYPERACTIVITY DISORDER), INATTENTIVE TYPE: ICD-10-CM

## 2024-01-10 RX ORDER — METHYLPHENIDATE HYDROCHLORIDE 54 MG/1
54 TABLET ORAL EVERY MORNING
Qty: 30 TABLET | Refills: 0 | OUTPATIENT
Start: 2024-01-10

## 2024-03-15 ENCOUNTER — OFFICE VISIT (OUTPATIENT)
Dept: FAMILY MEDICINE | Facility: CLINIC | Age: 24
End: 2024-03-15
Payer: COMMERCIAL

## 2024-03-15 VITALS
BODY MASS INDEX: 32.15 KG/M2 | RESPIRATION RATE: 16 BRPM | HEIGHT: 72 IN | WEIGHT: 237.4 LBS | TEMPERATURE: 97.9 F | OXYGEN SATURATION: 99 % | DIASTOLIC BLOOD PRESSURE: 78 MMHG | HEART RATE: 69 BPM | SYSTOLIC BLOOD PRESSURE: 116 MMHG

## 2024-03-15 DIAGNOSIS — F90.0 ADHD (ATTENTION DEFICIT HYPERACTIVITY DISORDER), INATTENTIVE TYPE: ICD-10-CM

## 2024-03-15 PROCEDURE — 99213 OFFICE O/P EST LOW 20 MIN: CPT | Performed by: PHYSICIAN ASSISTANT

## 2024-03-15 RX ORDER — METHYLPHENIDATE HYDROCHLORIDE 54 MG/1
54 TABLET ORAL EVERY MORNING
Qty: 30 TABLET | Refills: 0 | Status: SHIPPED | OUTPATIENT
Start: 2024-03-15 | End: 2024-05-31

## 2024-03-15 NOTE — PROGRESS NOTES
Assessment & Plan     (F90.0) ADHD (attention deficit hyperactivity disorder), inattentive type  Comment: Doing well continue current treatment.  Plan: methylphenidate HCl ER, OSM, (CONCERTA) 54 MG         CR tablet, methylphenidate HCl ER, OSM,         (CONCERTA) 54 MG CR tablet, methylphenidate HCl        ER, OSM, (CONCERTA) 54 MG CR tablet                    BMI  Estimated body mass index is 32.2 kg/m  as calculated from the following:    Height as of this encounter: 1.829 m (6').    Weight as of this encounter: 107.7 kg (237 lb 6.4 oz).             Marshall Calle is a 23 year old, presenting for the following health issues:  A.D.H.D (Refill medication/)        3/15/2024     1:06 PM   Additional Questions   [facility] to [facility] for ADHD  Doing well tolerates medication no side effects  Works well  [place]  No complaints or concerns    History of Present Illness       Reason for visit:  ADHD Medicine    He eats 2-3 servings of fruits and vegetables daily.He consumes 0 sweetened beverage(s) daily.He exercises with enough effort to increase his heart rate 60 or more minutes per day.  He exercises with enough effort to increase his heart rate 6 days per week.   He is taking medications regularly.                   Objective    /78 (BP Location: Right arm, Patient Position: Sitting, Cuff Size: Adult Large)   Pulse 69   Temp 97.9  F (36.6  C) (Tympanic)   Resp 16   Ht 1.829 m (6')   Wt 107.7 kg (237 lb 6.4 oz)   SpO2 99%   BMI 32.20 kg/m    Body mass index is 32.2 kg/m .  Physical Exam alert attentive no acute distress  Respirations unlabored  Cardiovascular normal in rate  Dressed and groomed appropriately  Mood and affect normal speech fluent sensorium clear              Signed Electronically by: LIZETTE Gruber

## 2024-04-19 ENCOUNTER — OFFICE VISIT (OUTPATIENT)
Dept: FAMILY MEDICINE | Facility: CLINIC | Age: 24
End: 2024-04-19
Payer: COMMERCIAL

## 2024-04-19 VITALS
RESPIRATION RATE: 16 BRPM | SYSTOLIC BLOOD PRESSURE: 136 MMHG | OXYGEN SATURATION: 99 % | HEIGHT: 72 IN | HEART RATE: 74 BPM | DIASTOLIC BLOOD PRESSURE: 86 MMHG | BODY MASS INDEX: 32.63 KG/M2 | WEIGHT: 240.9 LBS | TEMPERATURE: 98.2 F

## 2024-04-19 DIAGNOSIS — Z11.3 SCREENING EXAMINATION FOR STI: ICD-10-CM

## 2024-04-19 DIAGNOSIS — Z11.59 NEED FOR HEPATITIS C SCREENING TEST: ICD-10-CM

## 2024-04-19 DIAGNOSIS — F90.8 ATTENTION DEFICIT HYPERACTIVITY DISORDER (ADHD), OTHER TYPE: ICD-10-CM

## 2024-04-19 DIAGNOSIS — Z11.4 SCREENING FOR HIV (HUMAN IMMUNODEFICIENCY VIRUS): Primary | ICD-10-CM

## 2024-04-19 PROCEDURE — 86803 HEPATITIS C AB TEST: CPT | Performed by: PHYSICIAN ASSISTANT

## 2024-04-19 PROCEDURE — 99213 OFFICE O/P EST LOW 20 MIN: CPT | Performed by: PHYSICIAN ASSISTANT

## 2024-04-19 PROCEDURE — 87591 N.GONORRHOEAE DNA AMP PROB: CPT | Performed by: PHYSICIAN ASSISTANT

## 2024-04-19 PROCEDURE — 86780 TREPONEMA PALLIDUM: CPT | Performed by: PHYSICIAN ASSISTANT

## 2024-04-19 PROCEDURE — 87491 CHLMYD TRACH DNA AMP PROBE: CPT | Performed by: PHYSICIAN ASSISTANT

## 2024-04-19 PROCEDURE — 36415 COLL VENOUS BLD VENIPUNCTURE: CPT | Performed by: PHYSICIAN ASSISTANT

## 2024-04-19 PROCEDURE — 87389 HIV-1 AG W/HIV-1&-2 AB AG IA: CPT | Performed by: PHYSICIAN ASSISTANT

## 2024-04-19 NOTE — PROGRESS NOTES
Assessment & Plan     (Z11.4) Screening for HIV (human immunodeficiency virus)  (primary encounter diagnosis)  Comment: Screening per his request  Plan: HIV Antigen Antibody Combo            (Z11.59) Need for hepatitis C screening test  Comment: Screening per recommendations  Plan: Hepatitis C Screen Reflex to HCV RNA Quant and         Genotype            (Z11.3) Screening examination for STI  Comment: Screening per his request  Plan: Treponema Abs w Reflex to RPR and Titer,         Chlamydia & Gonorrhea by PCR, G[facility]/Range -         Clinic Collect            (F90.8) Attention deficit hyperactivity disorder (ADHD), other type  Comment: CSA updated  Plan:             BMI  Estimated body mass index is 32.67 kg/m  as calculated from the following:    Height as of this encounter: 1.829 m (6').    Weight as of this encounter: 109.3 kg (240 lb 14.4 oz).             Marshall Calle is a 24 year old, presenting for the following health issues: here for STD testing, no symptoms, no known contact  STD Screening        4/19/2024    12:18 PM   Additional Questions   Roomed by radha faria   Accompanied by self     Patient would like to have STI screening performed he has no symptoms his partner has no symptoms he said no new partners  He needs to update an CSA for his ADHD meds he has no questions or concerns    History of Present Illness       Reason for visit:  STD Testing Questions    He eats 2-3 servings of fruits and vegetables daily.He consumes 1 sweetened beverage(s) daily.He exercises with enough effort to increase his heart rate 60 or more minutes per day.  He exercises with enough effort to increase his heart rate 5 days per week.   He is taking medications regularly.                 Objective    /86 (BP Location: Right arm, Patient Position: Sitting)   Pulse 74   Temp 98.2  F (36.8  C)   Resp 16   Ht 1.829 m (6')   Wt 109.3 kg (240 lb 14.4 oz)   SpO2 99%   BMI 32.67 kg/m    Body mass index is 32.67  kg/m .  Physical Exam alert oriented no exam carried out today              Signed Electronically by: LIZETTE Gruber

## 2024-04-19 NOTE — LETTER
[facility] [place]  04/19/24  Patient: Luc Busch  YOB: 2000  Medical Record Number: [id number]                                                                                  Non-Opioid Controlled Substance Agreement    This is an agreement between you and your provider regarding safe and appropriate use of controlled substances prescribed by your care team. Controlled substances are?medicines that can cause physical and mental dependence (abuse).     There are strict laws about having and using these medicines. We here at [facility] are  committed to working with you in your efforts to get better. To support you in this work, we'll help you schedule regular office appointments for medicine refills. If we must cancel or change your appointment for any reason, we'll make sure you have enough medicine to last until your next appointment.     As a Provider, I will:   Listen carefully to your concerns while treating you with respect.   Recommend a treatment plan that I believe is in your best interest and may involve therapies other than medicine.    Talk with you often about the possible benefits and the risk of harm of any medicine that we prescribe for you.  Assess the safety of this medicine and check how well it works.    Provide a plan on how to taper (discontinue or go off) using this medicine if the decision is made to stop its use.      ::  As a Patient, I understand controlled substances:     Are prescribed by my care provider to help me function or work and manage my condition(s).?  Are strong medicines and can cause serious side effects.     Need to be taken exactly as prescribed.?Combining controlled substances with certain medicines or chemicals (such as illegal drugs, alcohol, sedatives, sleeping pills, and benzodiazepines) can be dangerous or even fatal.? If I stop taking my medicines suddenly, I may have severe withdrawal symptoms.     The risks, benefits,  and side effects of these medicine(s) were explained to me. I agree that:    I will take part in other treatments as advised by my care team. This may be psychiatry or counseling, physical therapy, behavioral therapy, group treatment or a referral to specialist.    I will keep all my appointments and understand this is part of the monitoring of controlled substances.?My care team may require an office visit for EVERY controlled substance refill. If I miss appointments or don t follow instructions, my care team may stop my medicine    I will take my medicines as prescribed. I will not change the dose or schedule unless my care team tells me to. There will be no refills if I run out early.      I may be asked to come to the clinic and complete a urine drug test or complete a pill count. If I don t give a urine sample or participate in a pill count, the care team may stop my medicine.    I will only receive controlled substance prescriptions from this clinic. If I am treated by another provider, I will tell them that I am taking controlled substances and that I have a treatment agreement with this provider. I will inform my [facility] care team within one business day if I am given a prescription for any controlled substance by another healthcare provider. My [facility] care team can contact other providers and pharmacists about my use of any medicines.    It is up to me to make sure that I don't run out of my medicines on weekends or holidays.?If my care team is willing to refill my prescription without a visit, I must request refills only during office hours. Refills may take up to 3 business days to process. I will use one pharmacy to fill all my controlled substance prescriptions. I will notify the clinic about any changes to my insurance or medicine availability.    I am responsible for my prescriptions. If the medicine/prescription is lost, stolen or destroyed, it will not be replaced.?I also agree  not to share controlled substance medicines with anyone.     I am aware I should not use any illegal or recreational drugs. I agree not to drink alcohol unless my care team says I can.     If I enroll in the [place] Medical Cannabis program, I will tell my care team before my next refill.    I will tell my care team right away if I become pregnant, have a new medical problem treated outside of my regular clinic, or have a change in my medicines.     I understand that this medicine can affect my thinking, judgment and reaction time.? Alcohol and drugs affect the brain and body, which can affect the safety of my driving. Being under the influence of alcohol or drugs can affect my decision-making, behaviors, personal safety and the safety of others. Driving while impaired (DWI) can occur if a person is driving, operating or in physical control of a car, motorcycle, boat, snowmobile, ATV, motorbike, off-road vehicle or any other motor vehicle ([place] Statute 169A.20). I understand the risk if I choose to drive or operate any vehicle or machinery.    I understand that if I do not follow any of the conditions above, my prescriptions or treatment may be stopped or changed.   I agree that my provider, clinic care team and pharmacy may work with any city, state or federal law enforcement agency that investigates the misuse, sale or other diversion of my controlled medicine. I will allow my provider to discuss my care with, or share a copy of, this agreement with any other treating provider, pharmacy or emergency room where I receive care.     I have read this agreement and have asked questions about anything I did not understand.    ________________________________________________________  Patient Signature - Luc Busch     ___________________                   Date     ________________________________________________________  Provider Signature - LIZETTE Gruber       ___________________                   Date      ________________________________________________________  Witness Signature (required if provider not present while patient signing)          ___________________                   Date

## 2024-04-20 LAB
C TRACH DNA SPEC QL PROBE+SIG AMP: NEGATIVE
N GONORRHOEA DNA SPEC QL NAA+PROBE: NEGATIVE
T PALLIDUM AB SER QL: NONREACTIVE

## 2024-04-21 LAB
HCV AB SERPL QL IA: NONREACTIVE
HIV 1+2 AB+HIV1 P24 AG SERPL QL IA: NONREACTIVE

## 2024-05-31 ENCOUNTER — OFFICE VISIT (OUTPATIENT)
Dept: FAMILY MEDICINE | Facility: CLINIC | Age: 24
End: 2024-05-31
Payer: COMMERCIAL

## 2024-05-31 VITALS
RESPIRATION RATE: 18 BRPM | TEMPERATURE: 97.6 F | WEIGHT: 214.2 LBS | OXYGEN SATURATION: 97 % | HEART RATE: 69 BPM | BODY MASS INDEX: 29.01 KG/M2 | DIASTOLIC BLOOD PRESSURE: 80 MMHG | SYSTOLIC BLOOD PRESSURE: 118 MMHG | HEIGHT: 72 IN

## 2024-05-31 DIAGNOSIS — R53.83 OTHER FATIGUE: Primary | ICD-10-CM

## 2024-05-31 DIAGNOSIS — F90.8 ATTENTION DEFICIT HYPERACTIVITY DISORDER (ADHD), OTHER TYPE: ICD-10-CM

## 2024-05-31 LAB
BASOPHILS # BLD AUTO: 0 10E3/UL (ref 0–0.2)
BASOPHILS NFR BLD AUTO: 1 %
EOSINOPHIL # BLD AUTO: 0.5 10E3/UL (ref 0–0.7)
EOSINOPHIL NFR BLD AUTO: 14 %
ERYTHROCYTE [DISTWIDTH] IN BLOOD BY AUTOMATED COUNT: 13.5 % (ref 10–15)
HCT VFR BLD AUTO: 43.8 % (ref 40–53)
HGB BLD-MCNC: 14.7 G/DL (ref 13.3–17.7)
IMM GRANULOCYTES # BLD: 0 10E3/UL
IMM GRANULOCYTES NFR BLD: 0 %
LYMPHOCYTES # BLD AUTO: 1.2 10E3/UL (ref 0.8–5.3)
LYMPHOCYTES NFR BLD AUTO: 30 %
MCH RBC QN AUTO: 28.9 PG (ref 26.5–33)
MCHC RBC AUTO-ENTMCNC: 33.6 G/DL (ref 31.5–36.5)
MCV RBC AUTO: 86 FL (ref 78–100)
MONOCYTES # BLD AUTO: 0.6 10E3/UL (ref 0–1.3)
MONOCYTES NFR BLD AUTO: 16 %
NEUTROPHILS # BLD AUTO: 1.6 10E3/UL (ref 1.6–8.3)
NEUTROPHILS NFR BLD AUTO: 40 %
PLATELET # BLD AUTO: 324 10E3/UL (ref 150–450)
RBC # BLD AUTO: 5.09 10E6/UL (ref 4.4–5.9)
WBC # BLD AUTO: 4 10E3/UL (ref 4–11)

## 2024-05-31 PROCEDURE — 84443 ASSAY THYROID STIM HORMONE: CPT | Performed by: PHYSICIAN ASSISTANT

## 2024-05-31 PROCEDURE — 80053 COMPREHEN METABOLIC PANEL: CPT | Performed by: PHYSICIAN ASSISTANT

## 2024-05-31 PROCEDURE — 85025 COMPLETE CBC W/AUTO DIFF WBC: CPT | Mod: QW | Performed by: PHYSICIAN ASSISTANT

## 2024-05-31 PROCEDURE — 36415 COLL VENOUS BLD VENIPUNCTURE: CPT | Performed by: PHYSICIAN ASSISTANT

## 2024-05-31 PROCEDURE — 99213 OFFICE O/P EST LOW 20 MIN: CPT | Performed by: PHYSICIAN ASSISTANT

## 2024-05-31 NOTE — PROGRESS NOTES
Assessment & Plan     (R53.83) Other fatigue  (primary encounter diagnosis)  Comment: Stable  Plan: CBC with Platelets & Differential,         Comprehensive metabolic panel (BMP + Alb, Alk         Phos, ALT, AST, Total. Bili, TP), TSH with free        T4 reflex        Will check labs if normal symptoms should resolve within a couple of weeks if related to coming off his ADHD meds if not we will need to reassess    (F90.8) Attention deficit hyperactivity disorder (ADHD), other type  Comment: Stable at current  Plan: See above          BMI  Estimated body mass index is 29.05 kg/m  as calculated from the following:    Height as of this encounter: 1.829 m (6').    Weight as of this encounter: 97.2 kg (214 lb 3.2 oz).             Marshall Calle is a 24 year old, presenting for the following health issues:  A.D.H.D (Discuss medication, stopped medication approx 2 week ago )        5/31/2024     8:43 AM   Additional Questions   Roomed by KELLIE Boone     24-year-old with history of ADHD presents with complaint of fatigue  He stopped his Concerta a couple weeks ago he thought he could be okay during the summer without it  He is working on awaiting apartments  His significant other has moved to [place]  He feels in the morning it takes him a while to get fully awake  He feels as though he sleeps well  He has not had any weight loss or other systemic or constitutional change  There is no family history of endocrine disorder  He does not feel down or despondent    History of Present Illness       Reason for visit:  Medicine    He eats 0-1 servings of fruits and vegetables daily.He consumes 1 sweetened beverage(s) daily.He exercises with enough effort to increase his heart rate 60 or more minutes per day.  He exercises with enough effort to increase his heart rate 5 days per week.   He is taking medications regularly.                     Objective    /80 (BP Location: Right arm, Patient Position: Sitting, Cuff  Size: Adult Large)   Pulse 69   Temp 97.6  F (36.4  C) (Tympanic)   Resp 18   Ht 1.829 m (6')   Wt 97.2 kg (214 lb 3.2 oz)   SpO2 97%   BMI 29.05 kg/m    Body mass index is 29.05 kg/m .  Physical Exam oropharynx benign  Neck supple no adenopathy no thyromegaly  Lungs clear ventilated  Cardiovascular regular rate and rhythm  He is dressed and groomed appropriately  Speech is fluent sensorium clear affect normal              Signed Electronically by: LIZETTE Gruber

## 2024-06-01 LAB
ALBUMIN SERPL BCG-MCNC: 4.5 G/DL (ref 3.5–5.2)
ALP SERPL-CCNC: 66 U/L (ref 40–150)
ALT SERPL W P-5'-P-CCNC: 29 U/L (ref 0–70)
ANION GAP SERPL CALCULATED.3IONS-SCNC: 9 MMOL/L (ref 7–15)
AST SERPL W P-5'-P-CCNC: 23 U/L (ref 0–45)
BILIRUB SERPL-MCNC: 0.3 MG/DL
BUN SERPL-MCNC: 15.2 MG/DL (ref 6–20)
CALCIUM SERPL-MCNC: 8.9 MG/DL (ref 8.6–10)
CHLORIDE SERPL-SCNC: 106 MMOL/L (ref 98–107)
CREAT SERPL-MCNC: 0.98 MG/DL (ref 0.67–1.17)
DEPRECATED HCO3 PLAS-SCNC: 24 MMOL/L (ref 22–29)
EGFRCR SERPLBLD CKD-EPI 2021: >90 ML/MIN/1.73M2
GLUCOSE SERPL-MCNC: 84 MG/DL (ref 70–99)
POTASSIUM SERPL-SCNC: 4.4 MMOL/L (ref 3.4–5.3)
PROT SERPL-MCNC: 6.8 G/DL (ref 6.4–8.3)
SODIUM SERPL-SCNC: 139 MMOL/L (ref 135–145)
TSH SERPL DL<=0.005 MIU/L-ACNC: 1.79 UIU/ML (ref 0.3–4.2)

## 2024-10-06 ENCOUNTER — HEALTH MAINTENANCE LETTER (OUTPATIENT)
Age: 24
End: 2024-10-06

## 2024-10-30 ENCOUNTER — OFFICE VISIT (OUTPATIENT)
Dept: FAMILY MEDICINE | Facility: CLINIC | Age: 24
End: 2024-10-30
Payer: COMMERCIAL

## 2024-10-30 VITALS
WEIGHT: 242.2 LBS | OXYGEN SATURATION: 97 % | SYSTOLIC BLOOD PRESSURE: 120 MMHG | HEART RATE: 55 BPM | RESPIRATION RATE: 18 BRPM | BODY MASS INDEX: 32.8 KG/M2 | HEIGHT: 72 IN | TEMPERATURE: 97.5 F | DIASTOLIC BLOOD PRESSURE: 70 MMHG

## 2024-10-30 DIAGNOSIS — L98.9 SKIN LESION OF LEFT ARM: ICD-10-CM

## 2024-10-30 DIAGNOSIS — F90.0 ADHD (ATTENTION DEFICIT HYPERACTIVITY DISORDER), INATTENTIVE TYPE: Primary | ICD-10-CM

## 2024-10-30 PROCEDURE — 99213 OFFICE O/P EST LOW 20 MIN: CPT | Performed by: PHYSICIAN ASSISTANT

## 2024-10-30 RX ORDER — METHYLPHENIDATE HYDROCHLORIDE 54 MG/1
54 TABLET ORAL DAILY
Qty: 30 TABLET | Refills: 0 | Status: SHIPPED | OUTPATIENT
Start: 2024-10-30 | End: 2024-11-29

## 2024-10-30 RX ORDER — METHYLPHENIDATE HYDROCHLORIDE 54 MG/1
54 TABLET ORAL DAILY
Qty: 30 TABLET | Refills: 0 | Status: SHIPPED | OUTPATIENT
Start: 2024-12-29 | End: 2025-01-28

## 2024-10-30 RX ORDER — METHYLPHENIDATE HYDROCHLORIDE 54 MG/1
54 TABLET ORAL DAILY
Qty: 30 TABLET | Refills: 0 | Status: SHIPPED | OUTPATIENT
Start: 2024-11-29 | End: 2024-12-29

## 2024-12-07 ENCOUNTER — ANCILLARY PROCEDURE (OUTPATIENT)
Dept: GENERAL RADIOLOGY | Facility: CLINIC | Age: 24
End: 2024-12-07
Attending: FAMILY MEDICINE
Payer: COMMERCIAL

## 2024-12-07 ENCOUNTER — OFFICE VISIT (OUTPATIENT)
Dept: URGENT CARE | Facility: URGENT CARE | Age: 24
End: 2024-12-07
Payer: COMMERCIAL

## 2024-12-07 VITALS
HEART RATE: 54 BPM | OXYGEN SATURATION: 100 % | HEIGHT: 71 IN | DIASTOLIC BLOOD PRESSURE: 78 MMHG | BODY MASS INDEX: 32.24 KG/M2 | TEMPERATURE: 97.4 F | SYSTOLIC BLOOD PRESSURE: 136 MMHG | WEIGHT: 230.3 LBS | RESPIRATION RATE: 16 BRPM

## 2024-12-07 DIAGNOSIS — S62.640A CLOSED NONDISPLACED FRACTURE OF PROXIMAL PHALANX OF RIGHT INDEX FINGER, INITIAL ENCOUNTER: Primary | ICD-10-CM

## 2024-12-07 PROCEDURE — 99213 OFFICE O/P EST LOW 20 MIN: CPT | Performed by: FAMILY MEDICINE

## 2024-12-07 PROCEDURE — 73140 X-RAY EXAM OF FINGER(S): CPT | Mod: TC | Performed by: RADIOLOGY

## 2024-12-07 NOTE — PATIENT INSTRUCTIONS
Frederick tape the ring and 5th finger together  Limit activities  Follow-up on December 13 for recheck

## 2024-12-07 NOTE — PROGRESS NOTES
"Luc was seen today for finger.    Diagnoses and all orders for this visit:    Closed nondisplaced fracture of proximal phalanx of right index finger, initial encounter  -     XR Finger Right G/E 2 Views      Patient Instructions   Frederick tape the ring and 5th finger together  Limit activities with the right hand - note written for school work / discuss return to play with hockey   Follow-up on December 13 for recheck       Subjective   Luc Busch is a 24 year old is presenting for the following health issues:  Right Hand Ring Finger - hockey injury, puck hit finger; noticeably swollen, bruised x 1 day       HPI : Luc Busch is a right handed male who was playing hockey for [employer] last night and a puck hit middle of right ring finger and bounced off.  His hand was hanging down so finger was extended.  He had pain in the middle of the finger.  His  wrapped the finger but PIP joint turned purple so wrap was removed.  He took ibuprofen.  This am the finger is very painful and hard to bend.      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  Right hand: Ring finger with swelling / ecchymoses over the PIP joint.  Tender slightly above and below the joint.  He is able to extend the finger but hard to fully flex joint.  CMS intact - normal capillary refill and sensation normal to light touch.      Results for orders placed or performed in visit on 12/07/24 (from the past 24 hours)   XR Finger Right G/E 2 Views    Narrative    EXAM: XR FINGER RIGHT G/E 2 VIEWS  LOCATION: [facility] " [facility]  DATE: 12/7/2024    INDICATION: Playing hockey last night and puck hit ring finger   swelling   bruising over PIP joint  COMPARISON: None.      Impression    IMPRESSION: Nondisplaced transverse fracture of the fourth proximal phalanx. Normal joint spaces and alignment.    NOTE: ABNORMAL REPORT    THE DICTATION ABOVE DESCRIBES AN ABNORMALITY FOR WHICH FOLLOW-UP IS NEEDED.           Procedure:  Patient's ring and little finger were fariha taped together.    Celina Kingsley MD

## 2024-12-07 NOTE — LETTER
2024    Luc Busch   2000        To Whom it May Concern;     Luc Busch sustained a finger fracture on  and will be unable to write and has limited use of keyboarding for the next 2-4 weeks.    Sincerely,        Celina Kingsley MD

## 2025-02-11 ENCOUNTER — OFFICE VISIT (OUTPATIENT)
Dept: FAMILY MEDICINE | Facility: CLINIC | Age: 25
End: 2025-02-11
Payer: COMMERCIAL

## 2025-02-11 VITALS
BODY MASS INDEX: 32.76 KG/M2 | SYSTOLIC BLOOD PRESSURE: 118 MMHG | TEMPERATURE: 97.8 F | DIASTOLIC BLOOD PRESSURE: 80 MMHG | OXYGEN SATURATION: 97 % | WEIGHT: 234 LBS | HEIGHT: 71 IN | RESPIRATION RATE: 16 BRPM | HEART RATE: 91 BPM

## 2025-02-11 DIAGNOSIS — J02.9 SORE THROAT: Primary | ICD-10-CM

## 2025-02-11 LAB
DEPRECATED S PYO AG THROAT QL EIA: NEGATIVE
S PYO DNA THROAT QL NAA+PROBE: NOT DETECTED

## 2025-02-11 PROCEDURE — 99213 OFFICE O/P EST LOW 20 MIN: CPT | Performed by: PHYSICIAN ASSISTANT

## 2025-02-11 PROCEDURE — 87651 STREP A DNA AMP PROBE: CPT | Performed by: PHYSICIAN ASSISTANT

## 2025-02-11 RX ORDER — METHYLPHENIDATE HYDROCHLORIDE 54 MG/1
TABLET ORAL
COMMUNITY

## 2025-02-11 ASSESSMENT — ENCOUNTER SYMPTOMS
SORE THROAT: 1
COUGH: 1

## 2025-02-11 NOTE — PROGRESS NOTES
"Results for orders placed or performed in visit on 02/11/25   Streptococcus A Rapid Screen w/Reflex to PCR - Clinic Collect     Status: Normal    Specimen: Throat; Swab   Result Value Ref Range    Group A Strep antigen Negative Negative       Assessment & Plan     (J02.9) Sore throat  (primary encounter diagnosis)  Comment: Appears to be viral  Plan: Streptococcus A Rapid Screen w/Reflex to PCR -         Clinic Collect, Group A Streptococcus PCR         Throat Swab        PCR pending symptomatic therapy treat if positive return if symptoms not improving end of the week or prior if worsening          BMI  Estimated body mass index is 32.64 kg/m  as calculated from the following:    Height as of this encounter: 1.803 m (5' 11\").    Weight as of this encounter: 106.1 kg (234 lb).             Marshall Calle is a 24 year old, presenting for the following health issues:  Pharyngitis and Cough        2/11/2025     2:04 PM   Additional Questions   Roomed by Sunitha RAPP     Luc Busch is a 24 year old male who presents with a sore throat. Started yesterday. No fever. Mild cough.  His significant other recently had some respiratory symptoms she got better with over-the-counter preparations  He has no shortness of breath  The finger that he fractured about 2 months ago he is healing he did return to playing hockey he still has some swelling    History of Present Illness       Reason for visit:  Cough Sore Throat  Symptom onset:  Today  Symptoms include:  Cough Sore Throat  Symptom intensity:  Mild  Symptom progression:  Worsening  Had these symptoms before:  No  What makes it worse:  No  What makes it better:  Dayquil   He is taking medications regularly.                     Objective    /80   Pulse 91   Temp 97.8  F (36.6  C)   Resp 16   Ht 1.803 m (5' 11\")   Wt 106.1 kg (234 lb)   SpO2 97%   BMI 32.64 kg/m    Body mass index is 32.64 kg/m .  Physical Exam alert attentive no acute distress vital signs " are stable he is afebrile  Ears canals and drums normal  Conjunctiva pink  Nasal mucosa is not inflamed there is no purulent rhinorrhea  Oropharynx mild injection no exudate  Neck supple no adenopathy  Lungs clear well ventilated no wheeze rales or rhonchi  Cardiovascular get rhythm              Signed Electronically by: LIZETTE Gruber